# Patient Record
(demographics unavailable — no encounter records)

---

## 2024-11-01 NOTE — ASSESSMENT
[FreeTextEntry1] : We discussed the underlying pathology. Treatment options reviewed. Aleve 440 use discussed as okay per his cardiologist. We discussed an injection. His prior injection experience revealed a blood sugar elevation.  He will consult his endocrinologist first.  PT is planned. Cautions discussed. Questions answered. Follow up 3-4 weeks.  Patient seen by Dr. Esau David, who determined the assessment and plan I, Shanna Irwin, am scribing for Dr. Esau David in his presence for the chief complaint, physical exam, studies, assessment, and/or plan.

## 2024-11-01 NOTE — PHYSICAL EXAM
[Right] : right shoulder [Mild] : mild [4 ___] : forward flexion 4[unfilled]/5 [5___] : external rotation 5[unfilled]/5 [Left] : left shoulder [Sitting] : sitting [] : no sensory deficits [de-identified] : pain with forward flexion. [FreeTextEntry9] : IR to T10. [TWNoteComboBox6] : internal rotation L4 [TWNoteComboBox4] : passive forward flexion 160 degrees [de-identified] : external rotation 60 degrees

## 2024-11-01 NOTE — DATA REVIEWED
[FreeTextEntry1] : [facility] CT R  8/13/24: I reviewed the images and my interpretation is that there is decent joint space. There is no suggestion of any tendon tear. The GH degeneration is minimal. There are some AC changes.

## 2024-11-01 NOTE — CONSULT LETTER
[Dear  ___] : Dear  [unfilled], [Consult Letter:] : I had the pleasure of evaluating your patient, [unfilled]. [FreeTextEntry1] : Thank you for referring your patient for consultation.  Please see my note below.   If you have any questions, please do not hesitate to contact me.   Sincerely,   Esau David M.D. Shoulder Surgery

## 2024-11-01 NOTE — IMAGING
[Right] : right shoulder [FreeTextEntry1] : 2v- The GH is OK. There are some AC changes. [FreeTextEntry5] : 2v- There is a type I acromion.

## 2024-11-01 NOTE — REASON FOR VISIT
[FreeTextEntry2] : This is a 72 year old male, RHD, retired , presenting with right shoulder pain since 5/1/24 after a fall. [place] he got dizzy and fell on his right arm. Pain occurred especially at night. The pain radiates down his right arm. No n/t. He went to his PCP in Aug 2024 who prescribed an antiinflammatory, PT, and CT scan. He did home exercises and 8 visits of PT which improved his ROM. He reports no significant relief with the anti inflammatory. He feels 66% better. It took him a month for his blood sugars to return to normal after a prior injection.

## 2024-12-02 NOTE — REASON FOR VISIT
[FreeTextEntry2] : This is a 72 year old male, RHD, retired , presenting with right shoulder pain since 5/1/24 after a fall. He [place] he got dizzy and fell on his right arm. Pain occurred especially at night. The pain radiates down his right arm. No n/t. He went to his PCP in Aug 2024 who prescribed an antiinflammatory, PT, and CT scan. He did home exercises and 8 visits of PT which improved his ROM.  It took him a month for his blood sugars to return to normal after a prior injection.  He took the Aleve, but it started to upset his stomach, so he stopped it.  Topical Lidocaine has helped.  PT is going well.  Overall, his pain less.

## 2024-12-02 NOTE — ASSESSMENT
[FreeTextEntry1] : He reports his sugars have been elevated. He is seeing his PCP regarding this. PT will continue. We discussed the resolution of sx. An MRI of the right humerus with vitamin E marker.  Questions answered.   Patient seen by Dr. Esau David, who determined the assessment and plan. Shalonda SANCHEZ participated in the care of the patient, including the history and physical exam. I, Yocasta Dunne, am scribing for Dr. Esau David in his presence for the chief complaint, physical exam, studies, assessment, and/or plan.

## 2024-12-02 NOTE — HISTORY OF PRESENT ILLNESS
[Right Arm] : right arm [7] : 7 [5] : 5 [Dull/Aching] : dull/aching [Sharp] : sharp [de-identified] : 12/2/24: patient is here for a follow up of the right shoulder pain. tats that the pain has improved but still has bad days where the pain is unbearable.   Right shoulder pain. [] : Post Surgical Visit: no [FreeTextEntry1] : shoulder [de-identified] : pt

## 2024-12-02 NOTE — PHYSICAL EXAM
[Right] : right shoulder [Mild] : mild [5___] : external rotation 5[unfilled]/5 [Left] : left shoulder [Sitting] : sitting [5 ___] : forward flexion 5[unfilled]/5 [] : no sensory deficits [FreeTextEntry8] : There is mid humeral tenderness. This is under a tattoo. [FreeTextEntry9] : IR to T10. [TWNoteComboBox6] : internal rotation L2 [TWNoteComboBox4] : passive forward flexion 160 degrees [de-identified] : external rotation 60 degrees